# Patient Record
Sex: FEMALE | Race: WHITE | NOT HISPANIC OR LATINO | Employment: FULL TIME | URBAN - NONMETROPOLITAN AREA
[De-identification: names, ages, dates, MRNs, and addresses within clinical notes are randomized per-mention and may not be internally consistent; named-entity substitution may affect disease eponyms.]

---

## 2021-04-06 DIAGNOSIS — Z23 ENCOUNTER FOR IMMUNIZATION: ICD-10-CM

## 2024-03-12 ENCOUNTER — OFFICE VISIT (OUTPATIENT)
Dept: URGENT CARE | Facility: CLINIC | Age: 54
End: 2024-03-12
Payer: COMMERCIAL

## 2024-03-12 VITALS
TEMPERATURE: 97.8 F | HEIGHT: 60 IN | RESPIRATION RATE: 16 BRPM | WEIGHT: 175 LBS | SYSTOLIC BLOOD PRESSURE: 162 MMHG | HEART RATE: 92 BPM | DIASTOLIC BLOOD PRESSURE: 100 MMHG | OXYGEN SATURATION: 99 % | BODY MASS INDEX: 34.36 KG/M2

## 2024-03-12 DIAGNOSIS — H10.33 ACUTE CONJUNCTIVITIS OF BOTH EYES, UNSPECIFIED ACUTE CONJUNCTIVITIS TYPE: ICD-10-CM

## 2024-03-12 DIAGNOSIS — J06.9 VIRAL URI WITH COUGH: Primary | ICD-10-CM

## 2024-03-12 LAB — S PYO AG THROAT QL: NEGATIVE

## 2024-03-12 PROCEDURE — 99203 OFFICE O/P NEW LOW 30 MIN: CPT

## 2024-03-12 PROCEDURE — 87880 STREP A ASSAY W/OPTIC: CPT

## 2024-03-12 RX ORDER — POLYMYXIN B SULFATE AND TRIMETHOPRIM 1; 10000 MG/ML; [USP'U]/ML
1 SOLUTION OPHTHALMIC EVERY 4 HOURS
Qty: 10 ML | Refills: 0 | Status: SHIPPED | OUTPATIENT
Start: 2024-03-12 | End: 2024-03-19

## 2024-03-12 RX ORDER — LOSARTAN POTASSIUM 50 MG/1
50 TABLET ORAL EVERY MORNING
COMMUNITY
Start: 2024-02-29

## 2024-03-12 RX ORDER — ROSUVASTATIN CALCIUM 10 MG/1
10 TABLET, COATED ORAL EVERY EVENING
COMMUNITY
Start: 2024-03-06

## 2024-03-12 NOTE — PROGRESS NOTES
[facility]        NAME: Myesha Kraus is a 54 y.o. female  : 1970    MRN: [id number]  DATE: 2024  TIME: 7:42 PM    Assessment and Plan   Viral URI with cough [J06.9]  1. Viral URI with cough  POCT rapid ANTIGEN strepA      2. Acute conjunctivitis of both eyes, unspecified acute conjunctivitis type  polymyxin b-trimethoprim (POLYTRIM) ophthalmic solution        Rapid POC strep testing negative. Symptoms likely related to viral etiology and encouraged continued supportive measures.  Conjunctivitis also likely viral as bilateral however will cover for bacterial etiology with Polytrim.  Follow up with PCP in 3-5 days or proceed to emergency department for worsening symptoms.  Patient verbalized understanding of instructions given.       Patient Instructions     Patient Instructions     Rapid POC strep testing negative  Continue with supportive measures, OTC Tylenol/Ibuprofen, nasal decongestants, and cough suppressants   Cool mist humidifiers, throat lozenges, salt gargles, honey, increased fluid intake and rest   Use eye drops as prescribed   Good hand hygiene   Follow up with PCP in 3-5 days  Present to ER if symptoms worsen     If tests have been performed at [facility], our office will contact you with results if changes need to be made to the care plan discussed with you at the visit.  You can review your full results on [facility] MyChart.    Upper Respiratory Infection   AMBULATORY CARE:   An upper respiratory infection  is also called a cold. Your nose, throat, ears, and sinuses may be affected. You are more likely to get a cold in the winter. Your risk of getting a cold may be increased if you smoke cigarettes or have allergies, such as hay fever.  What causes a cold?  A cold is caused by a virus. Many viruses can cause a cold, and each is contagious. This means the virus can be easily spread to another person when the sick person coughs or sneezes. The virus can also be spread if you  touch an object the virus is on and then touch your eyes, mouth, or nose.  Cold symptoms  are usually worst for the first 3 to 5 days. You may have any of the following:  Runny or stuffy nose    Sneezing and coughing    Sore throat or hoarseness    Red, watery, and sore eyes    Fatigue (you feel more tired than usual)    Chills and fever    Headache, body aches, or sore muscles    Call your local emergency number (911 in the ) if:   You have chest pain or trouble breathing.      Seek care immediately if:   You have a fever over 102ºF (39ºC).      Call your doctor if:   You have a low fever.    Your sore throat gets worse or you see white or yellow spots in your throat.    Your symptoms get worse after 3 to 5 days or are not better in 14 days.    You have a rash anywhere on your skin.    You have large, tender lumps in your neck.    You have thick, green, or yellow drainage from your nose.    You cough up thick yellow, green, or bloody mucus.    You have a bad earache.    You have questions or concerns about your condition or care.    Treatment:  Colds are caused by viruses and do not get better with antibiotics. Most people get better in 7 to 14 days. You may continue to cough for 2 to 3 weeks. The following may help decrease your symptoms:  Decongestants  help reduce nasal congestion and help you breathe more easily. If you take decongestant pills, they may make you feel restless or not able to sleep. Do not use decongestant sprays for more than a few days.    Cough suppressants  help reduce coughing. Ask your healthcare provider which type of cough medicine is best for you.     NSAIDs , such as ibuprofen, help decrease swelling, pain, and fever. NSAIDs can cause stomach bleeding or kidney problems in certain people. If you take blood thinner medicine, always ask your healthcare provider if NSAIDs are safe for you. Always read the medicine label and follow directions.    Acetaminophen  decreases pain and fever.  It is available without a doctor's order. Ask how much to take and how often to take it. Follow directions. Read the labels of all other medicines you are using to see if they also contain acetaminophen, or ask your doctor or pharmacist. Acetaminophen can cause liver damage if not taken correctly.    Manage a cold:   Rest as much as possible.  Slowly start to do more each day.    Drink more liquids as directed.  Liquids will help thin and loosen mucus so you can cough it up. Liquids will also help prevent dehydration. Liquids that help prevent dehydration include water, fruit juice, and broth. Do not drink liquids that contain caffeine. Caffeine can increase your risk for dehydration. Ask your healthcare provider how much liquid to drink each day.    Soothe a sore throat.  Gargle with warm salt water. Make salt water by dissolving ¼ teaspoon salt in 1 cup warm water. You may also suck on hard candy or throat lozenges. You may use a sore throat spray.    Use a humidifier or vaporizer.  Use a cool mist humidifier or a vaporizer to increase air moisture in your home. This may make it easier for you to breathe and help decrease your cough.    Use saline nasal drops as directed.  These help relieve congestion.    Apply petroleum-based jelly around the outside of your nostrils.  This can decrease irritation from blowing your nose.    Do not smoke.  Nicotine and other chemicals in cigarettes and cigars can make your symptoms worse. They can also cause infections such as bronchitis or pneumonia. Ask your healthcare provider for information if you currently smoke and need help to quit. E-cigarettes or smokeless tobacco still contain nicotine. Talk to your healthcare provider before you use these products.    Prevent a cold:   Wash your hands often.  Use soap and water every time you wash your hands. Rub your soapy hands together, lacing your fingers. Use the fingers of one hand to scrub under the nails of the other hand.  Wash for at least 20 seconds. Rinse with warm, running water for several seconds. Then dry your hands. Use hand  gel if soap and water are not available. Do not touch your eyes or mouth without washing your hands first.         Cover a sneeze or cough.  Use a tissue that covers your mouth and nose. Put the used tissue in the trash right away. Use the bend of your arm if a tissue is not available. Wash your hands well with soap and water or use a hand . Do not stand close to anyone who is sneezing or coughing.    Try to stay away from others while you are sick.  This is especially important during the first 2 to 3 days when the virus is more easily spread. Wait until a fever, cough, or other symptoms are gone before you return to work or other regular activities.    Do not share items while you are sick.  This includes food, drinks, eating utensils, and dishes.    Follow up with your doctor as directed:  Write down your questions so you remember to ask them during your visits.  © Copyright [employer] 2023 Information is for End User's use only and may not be sold, redistributed or otherwise used for commercial purposes.  The above information is an  only. It is not intended as medical advice for individual conditions or treatments. Talk to your doctor, nurse or pharmacist before following any medical regimen to see if it is safe and effective for you.      Chief Complaint     Chief Complaint   Patient presents with    Cold Like Symptoms     Cough, sore throat and tired x 1 day    Conjunctivitis     Both eyes red and draining         History of Present Illness       54-year-old female with no significant past medical history presents with complaints of nasal congestion, sore throat, and cough x 2 days.  Patient also reporting bilateral eye redness and discharge.  Denies known sick contacts or exposures.  No fever, chills, vomiting, or diarrhea. Does wear corrective lenses but not contact  lenses.     Conjunctivitis   Associated symptoms include congestion, ear pain, rhinorrhea, sore throat, cough, eye discharge and eye redness. Pertinent negatives include no fever, no abdominal pain, no diarrhea, no nausea, no vomiting, no ear discharge, no headaches, no wheezing, no rash and no eye pain.       Review of Systems   Review of Systems   Constitutional:  Negative for chills and fever.   HENT:  Positive for congestion, ear pain, rhinorrhea and sore throat. Negative for ear discharge, trouble swallowing and voice change.    Eyes:  Positive for discharge and redness. Negative for pain and visual disturbance.   Respiratory:  Positive for cough. Negative for shortness of breath and wheezing.    Cardiovascular:  Negative for chest pain.   Gastrointestinal:  Negative for abdominal pain, diarrhea, nausea and vomiting.   Musculoskeletal:  Negative for myalgias.   Skin:  Negative for rash.   Neurological:  Negative for headaches.         Current Medications       Current Outpatient Medications:     losartan (COZAAR) 50 mg tablet, Take 50 mg by mouth every morning, Disp: , Rfl:     polymyxin b-trimethoprim (POLYTRIM) ophthalmic solution, Administer 1 drop to both eyes every 4 (four) hours for 7 days, Disp: 10 mL, Rfl: 0    rosuvastatin (CRESTOR) 10 MG tablet, Take 10 mg by mouth every evening, Disp: , Rfl:     Current Allergies     Allergies as of 03/12/2024    (No Known Allergies)            The following portions of the patient's history were reviewed and updated as appropriate: allergies, current medications, past family history, past medical history, past social history, past surgical history and problem list.     Past Medical History:   Diagnosis Date    High cholesterol     Hypertension        Past Surgical History:   Procedure Laterality Date    AUGMENTATION BREAST      CHOLECYSTECTOMY         Family History   Problem Relation Age of Onset    Hypertension Mother     Cancer Father     Dementia Father           Medications have been verified.        Objective   /100   Pulse 92   Temp 97.8 °F (36.6 °C)   Resp 16   Ht 5' (1.524 m)   Wt 79.4 kg (175 lb)   SpO2 99%   BMI 34.18 kg/m²   No LMP recorded. Patient is postmenopausal.       Physical Exam     Physical Exam  Vitals and nursing note reviewed.   Constitutional:       General: She is not in acute distress.     Appearance: She is not toxic-appearing.   HENT:      Head: Normocephalic.      Right Ear: Tympanic membrane, ear canal and external ear normal.      Left Ear: Tympanic membrane, ear canal and external ear normal.      Nose: Congestion present.      Mouth/Throat:      Mouth: Mucous membranes are moist.      Pharynx: Posterior oropharyngeal erythema present.      Tonsils: No tonsillar exudate. 0 on the right. 0 on the left.      Comments: Mildly erythematous pharynx with mucoid drainage  Eyes:      General: Lids are normal. Vision grossly intact.         Right eye: No discharge.         Left eye: No discharge.      Extraocular Movements: Extraocular movements intact.      Conjunctiva/sclera:      Right eye: Right conjunctiva is injected.      Left eye: Left conjunctiva is injected.      Pupils: Pupils are equal, round, and reactive to light.   Cardiovascular:      Rate and Rhythm: Normal rate and regular rhythm.      Heart sounds: Normal heart sounds.   Pulmonary:      Effort: Pulmonary effort is normal. No respiratory distress.      Breath sounds: Normal breath sounds. No stridor. No wheezing, rhonchi or rales.   Lymphadenopathy:      Cervical: No cervical adenopathy.   Skin:     General: Skin is warm and dry.   Neurological:      Mental Status: She is alert and oriented to person, place, and time.      Gait: Gait is intact.   Psychiatric:         Mood and Affect: Mood normal.         Behavior: Behavior normal.

## 2024-03-12 NOTE — PATIENT INSTRUCTIONS
Rapid POC strep testing negative  Continue with supportive measures, OTC Tylenol/Ibuprofen, nasal decongestants, and cough suppressants   Cool mist humidifiers, throat lozenges, salt gargles, honey, increased fluid intake and rest   Use eye drops as prescribed   Good hand hygiene   Follow up with PCP in 3-5 days  Present to ER if symptoms worsen     If tests have been performed at [facility], our office will contact you with results if changes need to be made to the care plan discussed with you at the visit.  You can review your full results on [facility].    Upper Respiratory Infection   AMBULATORY CARE:   An upper respiratory infection  is also called a cold. Your nose, throat, ears, and sinuses may be affected. You are more likely to get a cold in the winter. Your risk of getting a cold may be increased if you smoke cigarettes or have allergies, such as hay fever.  What causes a cold?  A cold is caused by a virus. Many viruses can cause a cold, and each is contagious. This means the virus can be easily spread to another person when the sick person coughs or sneezes. The virus can also be spread if you touch an object the virus is on and then touch your eyes, mouth, or nose.  Cold symptoms  are usually worst for the first 3 to 5 days. You may have any of the following:  Runny or stuffy nose    Sneezing and coughing    Sore throat or hoarseness    Red, watery, and sore eyes    Fatigue (you feel more tired than usual)    Chills and fever    Headache, body aches, or sore muscles    Call your local emergency number (911 in the [place]) if:   You have chest pain or trouble breathing.      Seek care immediately if:   You have a fever over 102ºF (39ºC).      Call your doctor if:   You have a low fever.    Your sore throat gets worse or you see white or yellow spots in your throat.    Your symptoms get worse after 3 to 5 days or are not better in 14 days.    You have a rash anywhere on your skin.    You have large,  tender lumps in your neck.    You have thick, green, or yellow drainage from your nose.    You cough up thick yellow, green, or bloody mucus.    You have a bad earache.    You have questions or concerns about your condition or care.    Treatment:  Colds are caused by viruses and do not get better with antibiotics. Most people get better in 7 to 14 days. You may continue to cough for 2 to 3 weeks. The following may help decrease your symptoms:  Decongestants  help reduce nasal congestion and help you breathe more easily. If you take decongestant pills, they may make you feel restless or not able to sleep. Do not use decongestant sprays for more than a few days.    Cough suppressants  help reduce coughing. Ask your healthcare provider which type of cough medicine is best for you.     NSAIDs , such as ibuprofen, help decrease swelling, pain, and fever. NSAIDs can cause stomach bleeding or kidney problems in certain people. If you take blood thinner medicine, always ask your healthcare provider if NSAIDs are safe for you. Always read the medicine label and follow directions.    Acetaminophen  decreases pain and fever. It is available without a doctor's order. Ask how much to take and how often to take it. Follow directions. Read the labels of all other medicines you are using to see if they also contain acetaminophen, or ask your doctor or pharmacist. Acetaminophen can cause liver damage if not taken correctly.    Manage a cold:   Rest as much as possible.  Slowly start to do more each day.    Drink more liquids as directed.  Liquids will help thin and loosen mucus so you can cough it up. Liquids will also help prevent dehydration. Liquids that help prevent dehydration include water, fruit juice, and broth. Do not drink liquids that contain caffeine. Caffeine can increase your risk for dehydration. Ask your healthcare provider how much liquid to drink each day.    Soothe a sore throat.  Gargle with warm salt water. Make  salt water by dissolving ¼ teaspoon salt in 1 cup warm water. You may also suck on hard candy or throat lozenges. You may use a sore throat spray.    Use a humidifier or vaporizer.  Use a cool mist humidifier or a vaporizer to increase air moisture in your home. This may make it easier for you to breathe and help decrease your cough.    Use saline nasal drops as directed.  These help relieve congestion.    Apply petroleum-based jelly around the outside of your nostrils.  This can decrease irritation from blowing your nose.    Do not smoke.  Nicotine and other chemicals in cigarettes and cigars can make your symptoms worse. They can also cause infections such as bronchitis or pneumonia. Ask your healthcare provider for information if you currently smoke and need help to quit. E-cigarettes or smokeless tobacco still contain nicotine. Talk to your healthcare provider before you use these products.    Prevent a cold:   Wash your hands often.  Use soap and water every time you wash your hands. Rub your soapy hands together, lacing your fingers. Use the fingers of one hand to scrub under the nails of the other hand. Wash for at least 20 seconds. Rinse with warm, running water for several seconds. Then dry your hands. Use hand  gel if soap and water are not available. Do not touch your eyes or mouth without washing your hands first.         Cover a sneeze or cough.  Use a tissue that covers your mouth and nose. Put the used tissue in the trash right away. Use the bend of your arm if a tissue is not available. Wash your hands well with soap and water or use a hand . Do not stand close to anyone who is sneezing or coughing.    Try to stay away from others while you are sick.  This is especially important during the first 2 to 3 days when the virus is more easily spread. Wait until a fever, cough, or other symptoms are gone before you return to work or other regular activities.    Do not share items while  you are sick.  This includes food, drinks, eating utensils, and dishes.    Follow up with your doctor as directed:  Write down your questions so you remember to ask them during your visits.  © Copyright [employer] 2023 Information is for End User's use only and may not be sold, redistributed or otherwise used for commercial purposes.  The above information is an  only. It is not intended as medical advice for individual conditions or treatments. Talk to your doctor, nurse or pharmacist before following any medical regimen to see if it is safe and effective for you.

## 2024-07-20 ENCOUNTER — OFFICE VISIT (OUTPATIENT)
Dept: URGENT CARE | Facility: CLINIC | Age: 54
End: 2024-07-20
Payer: COMMERCIAL

## 2024-07-20 VITALS
DIASTOLIC BLOOD PRESSURE: 78 MMHG | HEIGHT: 60 IN | TEMPERATURE: 97.1 F | OXYGEN SATURATION: 97 % | RESPIRATION RATE: 18 BRPM | HEART RATE: 87 BPM | BODY MASS INDEX: 34.36 KG/M2 | SYSTOLIC BLOOD PRESSURE: 132 MMHG | WEIGHT: 175 LBS

## 2024-07-20 DIAGNOSIS — L03.213 PERIORBITAL CELLULITIS, UNSPECIFIED LATERALITY: Primary | ICD-10-CM

## 2024-07-20 PROCEDURE — S9083 URGENT CARE CENTER GLOBAL: HCPCS

## 2024-07-20 PROCEDURE — G0382 LEV 3 HOSP TYPE B ED VISIT: HCPCS

## 2024-07-20 RX ORDER — DOXYCYCLINE 100 MG/1
100 CAPSULE ORAL 2 TIMES DAILY
Qty: 14 CAPSULE | Refills: 0 | Status: SHIPPED | OUTPATIENT
Start: 2024-07-20 | End: 2024-07-27

## 2024-07-20 NOTE — PROGRESS NOTES
[facility]        NAME: Myesha Kraus is a 54 y.o. female  : 1970    MRN: [id number]  DATE: 2024  TIME: 3:20 PM    Assessment and Plan   Periorbital cellulitis, unspecified laterality [L03.213]  1. Periorbital cellulitis, unspecified laterality  doxycycline monohydrate (MONODOX) 100 mg capsule        Will cover for pseudomonas given patient reports recently swimming in pool prior to eruption.      Patient Instructions   You have been prescribed an antibiotic.  Take antibiotic as directed for the full duration.  Do not stop the antibiotics just because you are feeling better.  Side effects of antibiotics include diarrhea.  Eat yogurt or take a probiotic or acidophilus tablet while taking this medication to help prevent diarrhea and replenish good gut bacteria.    Follow up with PCP in 3-5 days.  Proceed to  ER if symptoms worsen.    Chief Complaint     Chief Complaint   Patient presents with    Eye Problem     C/o x3 days ago patient has a stye on her upper right eyelid, and a stye on her lower left lid that has since gotten swollen and painful. She states she had pink eye a few months ago so used the drops and also used visine drops.          History of Present Illness       Patient is a 54 year old female who presents to the office today for eye lid swelling that started on Thursday as two styes. She notes that she was here in march and was using those drops and visine. Was using tea bags and cucumbers. There is a rash under her eyes that started yesterday. Took  bendaryl without relief. Denies visual changes or pain in the eye. Was at the [place] [employer] on Monday. Wednesday she did go to the vet with her dog. Thursday she did place a car seat into a car also ate seafood Thday night (has had an allergy in the past at the beach).     Eye Problem   Associated symptoms include eye redness. Pertinent negatives include no eye discharge, fever, itching or photophobia.       Review of  Systems   Review of Systems   Constitutional:  Negative for chills and fever.   HENT:  Positive for facial swelling.    Eyes:  Positive for redness. Negative for photophobia, pain, discharge, itching and visual disturbance.   All other systems reviewed and are negative.        Current Medications       Current Outpatient Medications:     doxycycline monohydrate (MONODOX) 100 mg capsule, Take 1 capsule (100 mg total) by mouth 2 (two) times a day for 7 days, Disp: 14 capsule, Rfl: 0    losartan (COZAAR) 50 mg tablet, Take 50 mg by mouth every morning, Disp: , Rfl:     rosuvastatin (CRESTOR) 10 MG tablet, Take 10 mg by mouth every evening, Disp: , Rfl:     Current Allergies     Allergies as of 07/20/2024    (No Known Allergies)            The following portions of the patient's history were reviewed and updated as appropriate: allergies, current medications, past family history, past medical history, past social history, past surgical history and problem list.     Past Medical History:   Diagnosis Date    High cholesterol     Hypertension        Past Surgical History:   Procedure Laterality Date    AUGMENTATION BREAST      CHOLECYSTECTOMY         Family History   Problem Relation Age of Onset    Hypertension Mother     Cancer Father     Dementia Father          Medications have been verified.        Objective   /78   Pulse 87   Temp (!) 97.1 °F (36.2 °C)   Resp 18   Ht 5' (1.524 m)   Wt 79.4 kg (175 lb)   SpO2 97%   BMI 34.18 kg/m²        Physical Exam     Physical Exam  Vitals and nursing note reviewed.   Constitutional:       Appearance: Normal appearance. She is normal weight.   Eyes:      Extraocular Movements: Extraocular movements intact.      Conjunctiva/sclera: Conjunctivae normal.      Right eye: Right conjunctiva is not injected. No chemosis, exudate or hemorrhage.     Left eye: Left conjunctiva is not injected. No chemosis, exudate or hemorrhage.     Comments: Swelling and redness around  bilateral eyes and into bilateral cheeks.    Cardiovascular:      Rate and Rhythm: Normal rate and regular rhythm.      Pulses: Normal pulses.      Heart sounds: Normal heart sounds.   Pulmonary:      Effort: Pulmonary effort is normal.      Breath sounds: Normal breath sounds.   Skin:     General: Skin is warm.      Capillary Refill: Capillary refill takes less than 2 seconds.   Neurological:      Mental Status: She is alert.                    Yes

## 2024-07-21 ENCOUNTER — TELEPHONE (OUTPATIENT)
Dept: URGENT CARE | Facility: CLINIC | Age: 54
End: 2024-07-21

## 2024-07-21 ENCOUNTER — HOSPITAL ENCOUNTER (EMERGENCY)
Facility: HOSPITAL | Age: 54
Discharge: HOME | End: 2024-07-21
Attending: EMERGENCY MEDICINE
Payer: COMMERCIAL

## 2024-07-21 VITALS
SYSTOLIC BLOOD PRESSURE: 135 MMHG | BODY MASS INDEX: 39.3 KG/M2 | HEIGHT: 60 IN | RESPIRATION RATE: 18 BRPM | WEIGHT: 200.18 LBS | DIASTOLIC BLOOD PRESSURE: 79 MMHG | HEART RATE: 74 BPM | TEMPERATURE: 97.3 F | OXYGEN SATURATION: 95 %

## 2024-07-21 DIAGNOSIS — L03.211 FACIAL CELLULITIS: Primary | ICD-10-CM

## 2024-07-21 DIAGNOSIS — H10.9 CONJUNCTIVITIS: ICD-10-CM

## 2024-07-21 PROCEDURE — 99282 EMERGENCY DEPT VISIT SF MDM: CPT

## 2024-07-21 PROCEDURE — 99284 EMERGENCY DEPT VISIT MOD MDM: CPT | Performed by: PHYSICIAN ASSISTANT

## 2024-07-21 RX ORDER — OFLOXACIN 3 MG/ML
1 SOLUTION/ DROPS OPHTHALMIC ONCE
Status: COMPLETED | OUTPATIENT
Start: 2024-07-21 | End: 2024-07-21

## 2024-07-21 RX ADMIN — OFLOXACIN 1 DROP: 3 SOLUTION/ DROPS OPHTHALMIC at 12:45

## 2024-07-21 NOTE — ED PROVIDER NOTES
History  Chief Complaint   Patient presents with    Facial Swelling     Pt presented to this ED with spouse from home c/o facial redness and swelling around eyes with crusting over past 4 days. Pt seen at urgent care yesterday-dx cellulitis currently taking abx and benadryl w/o improvement. Denies visual changes/fevers     54-year-old female presents the emergency department for evaluation of redness and swelling around the eyes.  Patient states on Thursday she noticed a stye on the left upper lid and the right lower lid.  States she has had styes previously but not in a long time.  Reports on Friday symptoms seem to worsen with redness underneath both eyes.  She denies any visual changes.  Was seen at urgent care yesterday diagnosed with periorbital cellulitis started on doxycycline.  Patient states she was prompted to the emergency department as she has not had improvement yet on the antibiotics and was wondering if she did an IV dose of antibiotic.  Does admit that she was trying symptomatic treatment at home such as tea bags and cucumbers.  Reports she was at the vet office on Wednesday and is allergic to cats and also states she was recently swimming on Monday and going on to the water frequently.  Patient denies any fevers or chills.  States symptoms tend to be worse in the morning and then improves throughout the day.  Reports she is having some drainage from the eyes. She does not wear contacts. She does follow with an eye doctor.         Prior to Admission Medications   Prescriptions Last Dose Informant Patient Reported? Taking?   doxycycline monohydrate (MONODOX) 100 mg capsule 7/21/2024  No Yes   Sig: Take 1 capsule (100 mg total) by mouth 2 (two) times a day for 7 days   losartan (COZAAR) 50 mg tablet   Yes No   Sig: Take 50 mg by mouth every morning   rosuvastatin (CRESTOR) 10 MG tablet   Yes No   Sig: Take 10 mg by mouth every evening      Facility-Administered Medications: None       Past Medical  History:   Diagnosis Date    High cholesterol     Hypertension        Past Surgical History:   Procedure Laterality Date    AUGMENTATION BREAST      CHOLECYSTECTOMY         Family History   Problem Relation Age of Onset    Hypertension Mother     Cancer Father     Dementia Father      I have reviewed and agree with the history as documented.    E-Cigarette/Vaping     E-Cigarette/Vaping Substances     Social History     Tobacco Use    Smoking status: Never     Passive exposure: Never    Smokeless tobacco: Never   Substance Use Topics    Alcohol use: Not Currently    Drug use: Never       Review of Systems   Constitutional: Negative.    HENT:  Positive for facial swelling.         Redness and swelling under bilateral eyes   Eyes:  Positive for discharge and redness. Negative for photophobia, pain, itching and visual disturbance.   Respiratory: Negative.     Cardiovascular: Negative.    Gastrointestinal: Negative.    Musculoskeletal: Negative.    Skin:  Positive for color change.   Neurological: Negative.    All other systems reviewed and are negative.      Physical Exam  Physical Exam  Vitals and nursing note reviewed.   Constitutional:       General: She is not in acute distress.     Appearance: Normal appearance. She is not ill-appearing, toxic-appearing or diaphoretic.   HENT:      Head: Normocephalic.        Nose: Nose normal.   Eyes:      Extraocular Movements: Extraocular movements intact.      Conjunctiva/sclera:      Left eye: Left conjunctiva is injected.      Pupils: Pupils are equal, round, and reactive to light.   Pulmonary:      Effort: Pulmonary effort is normal.   Musculoskeletal:         General: Normal range of motion.   Skin:     General: Skin is warm and dry.      Findings: Erythema (see HENT exam) present.   Neurological:      General: No focal deficit present.      Mental Status: She is alert and oriented to person, place, and time.   Psychiatric:         Mood and Affect: Mood normal.          Vital Signs  ED Triage Vitals [07/21/24 1226]   Temperature Pulse Respirations Blood Pressure SpO2   (!) 97.3 °F (36.3 °C) 74 18 138/73 99 %      Temp src Heart Rate Source Patient Position - Orthostatic VS BP Location FiO2 (%)   -- Monitor Lying Right arm --      Pain Score       No Pain           Vitals:    07/21/24 1226 07/21/24 1230 07/21/24 1245   BP: 138/73 141/83 135/79   Pulse: 74 77 74   Patient Position - Orthostatic VS: Lying           Visual Acuity      ED Medications  Medications   ofloxacin (OCUFLOX) 0.3 % ophthalmic solution 1 drop (1 drop Both Eyes Given 7/21/24 1245)       Diagnostic Studies  Results Reviewed       None                   No orders to display              Procedures  Procedures         ED Course             Medical Decision Making  54 female presents emergency department for evaluation of swelling and redness under bilateral eyes.  Vitals and medical record reviewed.  Patient risk for the following but not limited to allergic shiners, periorbital cellulitis, conjunctivitis.  Patient's physical exam is consistent with a left-sided conjunctivitis and she was started on amoxicillin.  Physical exam questionable for allergic/shiners versus a cellulitis.  Recommended continue with doxycycline.  We discussed symptomatic treatment with over-the-counter medications as well and patient verbalized understanding.  She will follow-up with her ophthalmologist.  She will return with new or worsening symptoms patient was clinically and hemodynamically stable for discharge    Problems Addressed:  Conjunctivitis: acute illness or injury  Facial cellulitis: acute illness or injury    Risk  Prescription drug management.                 Disposition  Final diagnoses:   Facial cellulitis   Conjunctivitis     Time reflects when diagnosis was documented in both MDM as applicable and the Disposition within this note       Time User Action Codes Description Comment    7/21/2024 12:44 PM Fina Mayorga  Add [L03.211] Facial cellulitis     7/21/2024 12:44 PM Fina Mayorga Add [H10.9] Conjunctivitis           ED Disposition       ED Disposition   Discharge    Condition   Stable    Date/Time   Sun Jul 21, 2024 12:44 PM    Comment   Myesha Nayana discharge to home/self care.                   Follow-up Information       Follow up With Specialties Details Why Contact Info    Maverick Infante,  Internal Medicine   [address] [place] [address]  [phone]              Discharge Medication List as of 7/21/2024 12:47 PM        CONTINUE these medications which have NOT CHANGED    Details   doxycycline monohydrate (MONODOX) 100 mg capsule Take 1 capsule (100 mg total) by mouth 2 (two) times a day for 7 days, Starting Sat 7/20/2024, Until Sat 7/27/2024, Normal      losartan (COZAAR) 50 mg tablet Take 50 mg by mouth every morning, Starting Thu 2/29/2024, Historical Med      rosuvastatin (CRESTOR) 10 MG tablet Take 10 mg by mouth every evening, Starting Wed 3/6/2024, Historical Med             No discharge procedures on file.    PDMP Review       None            ED Provider  Electronically Signed by             Fina Mayorga PA-C  07/21/24 1912

## 2024-07-21 NOTE — ED NOTES
Pt in no acute distress. Ambulates with a steady gait. Verbalizes understanding of discharge instructions       Zonia Haney RN  07/21/24 2775

## 2024-07-21 NOTE — DISCHARGE INSTRUCTIONS
Please continue on prescribed antibiotics per close eye on this area to ensure over the next 48 hours you are experiencing improvement.  Please return to the emergency department with worsening.  Again I would recommend doing the warm compresses 4 times a day.  Also recommend continuing with the over-the-counter allergy medications and Benadryl.  Ofloxacin drops should be used 1 drop in affected eye 4 times a day while awake for the next 5 days.  Please follow-up with your eye specialist.  Return to the emergency department with new or worsening symptoms

## 2024-07-21 NOTE — TELEPHONE ENCOUNTER
Patient called requesting to speak to the provider she seen yesterday. I told patient that she was currently with another patient. She requested a call back, I took her phone number and told her I would ask the provider to call her when she was done with her current patient.

## 2024-07-21 NOTE — Clinical Note
Myesha Kraus was seen and treated in our emergency department on 7/21/2024.                Diagnosis:     Myesha  may return to work on return date.    She may return on this date: 07/23/2024         If you have any questions or concerns, please don't hesitate to call.      Fina Mayorga PA-C    ______________________________           _______________          _______________  Hospital Representative                              Date                                Time